# Patient Record
Sex: FEMALE | Race: WHITE | NOT HISPANIC OR LATINO | Employment: UNEMPLOYED | URBAN - METROPOLITAN AREA
[De-identification: names, ages, dates, MRNs, and addresses within clinical notes are randomized per-mention and may not be internally consistent; named-entity substitution may affect disease eponyms.]

---

## 2017-10-10 ENCOUNTER — HOSPITAL ENCOUNTER (OUTPATIENT)
Dept: RADIOLOGY | Facility: MEDICAL CENTER | Age: 62
End: 2017-10-10
Attending: FAMILY MEDICINE
Payer: COMMERCIAL

## 2017-10-10 DIAGNOSIS — Z12.39 BREAST SCREENING: ICD-10-CM

## 2017-10-10 PROCEDURE — G0202 SCR MAMMO BI INCL CAD: HCPCS

## 2018-01-20 ENCOUNTER — OFFICE VISIT (OUTPATIENT)
Dept: URGENT CARE | Facility: CLINIC | Age: 63
End: 2018-01-20
Payer: COMMERCIAL

## 2018-01-20 ENCOUNTER — APPOINTMENT (OUTPATIENT)
Dept: RADIOLOGY | Facility: IMAGING CENTER | Age: 63
End: 2018-01-20
Attending: NURSE PRACTITIONER
Payer: COMMERCIAL

## 2018-01-20 VITALS
TEMPERATURE: 97.8 F | BODY MASS INDEX: 20.89 KG/M2 | DIASTOLIC BLOOD PRESSURE: 70 MMHG | WEIGHT: 130 LBS | HEART RATE: 80 BPM | RESPIRATION RATE: 16 BRPM | SYSTOLIC BLOOD PRESSURE: 102 MMHG | HEIGHT: 66 IN | OXYGEN SATURATION: 96 %
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
## 2018-01-20 DIAGNOSIS — M54.2 NECK PAIN, ACUTE: ICD-10-CM

## 2018-01-20 DIAGNOSIS — S20.212A CHEST WALL CONTUSION, LEFT, INITIAL ENCOUNTER: ICD-10-CM

## 2018-01-20 PROCEDURE — 99213 OFFICE O/P EST LOW 20 MIN: CPT | Performed by: NURSE PRACTITIONER

## 2018-01-20 PROCEDURE — 72040 X-RAY EXAM NECK SPINE 2-3 VW: CPT | Mod: TC | Performed by: NURSE PRACTITIONER

## 2018-01-20 ASSESSMENT — ENCOUNTER SYMPTOMS
NECK PAIN: 0
BACK PAIN: 0
MYALGIAS: 0
WEAKNESS: 0
SHORTNESS OF BREATH: 0
HEADACHES: 0
DIZZINESS: 0
ABDOMINAL PAIN: 0
TINGLING: 0
COUGH: 0
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
## 2018-01-24 ENCOUNTER — OFFICE VISIT (OUTPATIENT)
Dept: MEDICAL GROUP | Facility: PHYSICIAN GROUP | Age: 63
End: 2018-01-24
Payer: COMMERCIAL

## 2018-01-24 VITALS
SYSTOLIC BLOOD PRESSURE: 112 MMHG | HEART RATE: 68 BPM | DIASTOLIC BLOOD PRESSURE: 86 MMHG | WEIGHT: 131 LBS | OXYGEN SATURATION: 98 % | TEMPERATURE: 98.1 F | BODY MASS INDEX: 21.05 KG/M2 | RESPIRATION RATE: 16 BRPM | HEIGHT: 66 IN

## 2018-01-24 DIAGNOSIS — F33.1 MODERATE EPISODE OF RECURRENT MAJOR DEPRESSIVE DISORDER (HCC): ICD-10-CM

## 2018-01-24 DIAGNOSIS — Z13.1 SCREENING FOR DIABETES MELLITUS (DM): ICD-10-CM

## 2018-01-24 DIAGNOSIS — Z13.6 SCREENING FOR CARDIOVASCULAR CONDITION: ICD-10-CM

## 2018-01-24 DIAGNOSIS — Z13.29 SCREENING FOR THYROID DISORDER: ICD-10-CM

## 2018-01-24 DIAGNOSIS — J30.89 ENVIRONMENTAL AND SEASONAL ALLERGIES: ICD-10-CM

## 2018-01-24 PROBLEM — Z76.89 ENCOUNTER TO ESTABLISH CARE: Status: ACTIVE | Noted: 2018-01-24

## 2018-01-24 PROBLEM — Z00.00 WELLNESS EXAMINATION: Status: ACTIVE | Noted: 2018-01-24

## 2018-01-24 PROCEDURE — 99213 OFFICE O/P EST LOW 20 MIN: CPT | Performed by: PHYSICIAN ASSISTANT

## 2018-01-24 RX ORDER — FLUTICASONE PROPIONATE 50 MCG
SPRAY, SUSPENSION (ML) NASAL
Qty: 1 BOTTLE | Refills: 0 | Status: SHIPPED | OUTPATIENT
Start: 2018-01-24 | End: 2018-04-17 | Stop reason: SDUPTHER

## 2018-01-24 RX ORDER — LORAZEPAM 1 MG/1
1 TABLET ORAL EVERY 4 HOURS PRN
COMMUNITY
End: 2018-01-24

## 2018-01-24 ASSESSMENT — PATIENT HEALTH QUESTIONNAIRE - PHQ9: CLINICAL INTERPRETATION OF PHQ2 SCORE: 0

## 2018-01-24 ASSESSMENT — PAIN SCALES - GENERAL: PAINLEVEL: NO PAIN

## 2018-01-24 NOTE — ASSESSMENT & PLAN NOTE
Chronic but stable condition. Patient states she takes cetirizine 10 mg once daily and uses Flonase daily to help alleviate symptoms. States symptoms are managed by medication. Asymptomatic at this time.

## 2018-01-24 NOTE — ASSESSMENT & PLAN NOTE
Chronic. States she follows up with her psychiatrist once monthly and her therapist once monthly. States she is prescribed mirtazapine 50 mg tablet once daily. States she is compliant with medication and experiences no side effects or complications from medication. States medication is managed by psychiatrist. Denies homicidal or suicidal ideation. States she has been experiencing appetite changes and mood fluctuations that are abnormal for her. Patient is concerned about thyroid and is requesting thyroid lab work. Denies hair loss, hoarseness of voice, dry skin, constipation, chest pain, shortness breath, palpitations, temperature tolerance.
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
## 2018-02-09 ENCOUNTER — HOSPITAL ENCOUNTER (OUTPATIENT)
Dept: LAB | Facility: MEDICAL CENTER | Age: 63
End: 2018-02-09
Attending: PHYSICIAN ASSISTANT
Payer: COMMERCIAL

## 2018-02-09 DIAGNOSIS — Z13.1 SCREENING FOR DIABETES MELLITUS (DM): ICD-10-CM

## 2018-02-09 DIAGNOSIS — Z13.6 SCREENING FOR CARDIOVASCULAR CONDITION: ICD-10-CM

## 2018-02-09 DIAGNOSIS — Z13.29 SCREENING FOR THYROID DISORDER: ICD-10-CM

## 2018-02-09 LAB
ALBUMIN SERPL BCP-MCNC: 3.9 G/DL (ref 3.2–4.9)
ALBUMIN/GLOB SERPL: 1.3 G/DL
ALP SERPL-CCNC: 50 U/L (ref 30–99)
ALT SERPL-CCNC: 18 U/L (ref 2–50)
ANION GAP SERPL CALC-SCNC: 4 MMOL/L (ref 0–11.9)
AST SERPL-CCNC: 16 U/L (ref 12–45)
BILIRUB SERPL-MCNC: 0.3 MG/DL (ref 0.1–1.5)
BUN SERPL-MCNC: 18 MG/DL (ref 8–22)
CALCIUM SERPL-MCNC: 9.6 MG/DL (ref 8.5–10.5)
CHLORIDE SERPL-SCNC: 108 MMOL/L (ref 96–112)
CHOLEST SERPL-MCNC: 187 MG/DL (ref 100–199)
CO2 SERPL-SCNC: 30 MMOL/L (ref 20–33)
CREAT SERPL-MCNC: 0.79 MG/DL (ref 0.5–1.4)
GLOBULIN SER CALC-MCNC: 2.9 G/DL (ref 1.9–3.5)
GLUCOSE SERPL-MCNC: 91 MG/DL (ref 65–99)
HDLC SERPL-MCNC: 42 MG/DL
LDLC SERPL CALC-MCNC: 125 MG/DL
POTASSIUM SERPL-SCNC: 5 MMOL/L (ref 3.6–5.5)
PROT SERPL-MCNC: 6.8 G/DL (ref 6–8.2)
SODIUM SERPL-SCNC: 142 MMOL/L (ref 135–145)
TRIGL SERPL-MCNC: 99 MG/DL (ref 0–149)
TSH SERPL DL<=0.005 MIU/L-ACNC: 3.36 UIU/ML (ref 0.38–5.33)

## 2018-02-09 PROCEDURE — 84443 ASSAY THYROID STIM HORMONE: CPT

## 2018-02-09 PROCEDURE — 80053 COMPREHEN METABOLIC PANEL: CPT

## 2018-02-09 PROCEDURE — 36415 COLL VENOUS BLD VENIPUNCTURE: CPT

## 2018-02-09 PROCEDURE — 80061 LIPID PANEL: CPT

## 2018-02-14 ENCOUNTER — TELEPHONE (OUTPATIENT)
Dept: MEDICAL GROUP | Facility: PHYSICIAN GROUP | Age: 63
End: 2018-02-14

## 2018-02-14 NOTE — TELEPHONE ENCOUNTER
----- Message from Radha Castle P.A.-C. sent at 2/14/2018  2:12 PM PST -----  Please call patient. I have reviewed labs and LDL (bad cholesterol) is mildly elevated. Electrolytes, fasting glucose, liver and kidney function is within normal limits. Thyroid lab work was within normal limits.   Advise patient to continue working on diet and exercise.     Thank you,    Charleen ACHARYA

## 2018-02-14 NOTE — LETTER
February 16, 2018        Melissa Holley  [address] [place] [address]        Dear Melissa    We were unable to reach you by phone, please message or call if you have questions.  Here are the results of your test(s):   ----- Message from Radha Castle P.A.-C. sent at 2/14/2018  2:12 PM PST -----  Please call patient. I have reviewed labs and LDL (bad cholesterol) is mildly elevated. Electrolytes, fasting glucose, liver and kidney function is within normal limits. Thyroid lab work was within normal limits.   Advise patient to continue working on diet and exercise.      Thank you,     Charleen ACHARYA       Sincerely,        Irena Irwin  Signed Electronically

## 2018-02-14 NOTE — TELEPHONE ENCOUNTER
Phone Number Called: [phone] (home)     Message: Unable to reach pt left vm to call back.     Left Message for patient to call back: yes

## 2018-02-16 NOTE — TELEPHONE ENCOUNTER
Phone Number Called: [phone] (home)     Message: Unable to reach pt left vm to call back. Sent letter to notify pt.     Left Message for patient to call back: yes

## 2018-02-21 ENCOUNTER — OFFICE VISIT (OUTPATIENT)
Dept: MEDICAL GROUP | Facility: PHYSICIAN GROUP | Age: 63
End: 2018-02-21
Payer: COMMERCIAL

## 2018-02-21 VITALS
SYSTOLIC BLOOD PRESSURE: 100 MMHG | WEIGHT: 130.8 LBS | OXYGEN SATURATION: 95 % | HEART RATE: 70 BPM | DIASTOLIC BLOOD PRESSURE: 74 MMHG | RESPIRATION RATE: 16 BRPM | HEIGHT: 66 IN | TEMPERATURE: 98.1 F | BODY MASS INDEX: 21.02 KG/M2

## 2018-02-21 DIAGNOSIS — F33.1 MODERATE EPISODE OF RECURRENT MAJOR DEPRESSIVE DISORDER (HCC): ICD-10-CM

## 2018-02-21 DIAGNOSIS — E78.00 ELEVATED LDL CHOLESTEROL LEVEL: ICD-10-CM

## 2018-02-21 PROCEDURE — 99213 OFFICE O/P EST LOW 20 MIN: CPT | Performed by: PHYSICIAN ASSISTANT

## 2018-02-21 RX ORDER — MIRTAZAPINE 15 MG/1
15 TABLET, FILM COATED ORAL DAILY
Refills: 1 | COMMUNITY
Start: 2018-02-17 | End: 2020-06-24 | Stop reason: SDUPTHER

## 2018-02-21 ASSESSMENT — PAIN SCALES - GENERAL: PAINLEVEL: NO PAIN

## 2018-02-21 NOTE — ASSESSMENT & PLAN NOTE
Discussed lipid profile lab work from 2/19/18 with patient. Results are as follows:  Cholesterol,Tot 187  100 - 199 mg/dL Final   Triglycerides 99  0 - 149 mg/dL Final   HDL 42  >=40 mg/dL Final      <100 mg/dL Final     Patient states she eats a gluten-free diet but denies having a regular exercise routine. States she takes over-the-counter 1000 mg of fish oil daily. Denies fried fatty foods. She  reports that she has been smoking.  She has a 6.25 pack-year smoking history. She has never used smokeless tobacco. Denies chest pain, shortness of breath, chest palpitations, dizziness, syncope, vision changes, severe headache, diaphoresis, nausea, vomiting, abdominal pain, jaw claudication.

## 2018-02-21 NOTE — ASSESSMENT & PLAN NOTE
Chronic but stable condition. Currently taken mirtazapine 50 mg Once daily. States she is compliant with medication experienced no side effects or complications from medication. States she follows up with her psychiatrist once monthly and her therapist once monthly. Medication is managed by psychiatrist. Denies homicidal or suicidal ideation. States mood is improving and she is still coping with her 's death.

## 2018-02-21 NOTE — PROGRESS NOTES
Chief Complaint   Patient presents with   • Results     labs        HISTORY OF PRESENT ILLNESS: Melissa Holley is an established 62 y.o. female here to discuss the evaluation and management of:    Elevated LDL cholesterol level  Discussed lipid profile lab work from 2/19/18 with patient. Results are as follows:  Cholesterol,Tot 187  100 - 199 mg/dL Final   Triglycerides 99  0 - 149 mg/dL Final   HDL 42  >=40 mg/dL Final      <100 mg/dL Final     Patient states she eats a gluten-free diet but denies having a regular exercise routine. States she takes over-the-counter 1000 mg of fish oil daily. Denies fried fatty foods. She  reports that she has been smoking.  She has a 6.25 pack-year smoking history. She has never used smokeless tobacco. Denies chest pain, shortness of breath, chest palpitations, dizziness, syncope, vision changes, severe headache, diaphoresis, nausea, vomiting, abdominal pain, jaw claudication.      Moderate episode of recurrent major depressive disorder (CMS-HCC)  Chronic but stable condition. Currently taken mirtazapine 50 mg Once daily. States she is compliant with medication experienced no side effects or complications from medication. States she follows up with her psychiatrist once monthly and her therapist once monthly. Medication is managed by psychiatrist. Denies homicidal or suicidal ideation. States mood is improving and she is still coping with her 's death.       Patient Active Problem List    Diagnosis Date Noted   • Elevated LDL cholesterol level 02/21/2018   • Encounter to establish care 01/24/2018   • Moderate episode of recurrent major depressive disorder (CMS-HCC) 01/24/2018   • Environmental and seasonal allergies 01/24/2018   • Family history of ischemic heart disease 09/11/2012   • Current smoker 09/11/2012       Allergies:Dilaudid [hydromorphone hcl] and Sulfa drugs    Current Outpatient Prescriptions   Medication Sig Dispense Refill   • mirtazapine  (REMERON) 15 MG Tab Take 15 mg by mouth every day.  1   • fluticasone (FLONASE) 50 MCG/ACT nasal spray SPRAY TWICE IN EACH NOSTRIL EVERY DAY AS DIRECTED 1 Bottle 0   • cetirizine (ZYRTEC) 10 MG Tab Take 10 mg by mouth every day.       No current facility-administered medications for this visit.        Social History   Substance Use Topics   • Smoking status: Current Every Day Smoker     Packs/day: 0.25     Years: 25.00   • Smokeless tobacco: Never Used   • Alcohol use No       Family Status   Relation Status   • Mother    • Father    • Sister Alive   • Brother Alive   • Maternal Grandmother    • Maternal Grandfather    • Paternal Grandmother    • Paternal Grandfather    • Sister Alive   • Sister Alive   • Sister Alive   • Sister Alive   • Sister    • Sister    • Sister    • Son Alive   • Son Alive   • Daughter Alive     Family History   Problem Relation Age of Onset   • Heart Disease Mother    • Hypertension Mother    • Stroke Mother    • Other Father      Hep C   • Thyroid Sister    • Hypertension Sister    • No Known Problems Brother    • Thyroid Sister    • Hypertension Sister    • Thyroid Sister    • Hypertension Sister    • Thyroid Sister    • Thyroid Sister      Hashimoto   • No Known Problems Sister    • No Known Problems Sister    • No Known Problems Sister    • Thyroid Son      No puititary gland   • No Known Problems Son    • No Known Problems Daughter        ROS:  Review of Systems   Constitutional: Negative for fever, chills, weight loss and malaise/fatigue.   HENT: Negative for ear pain, nosebleeds, congestion, sore throat and neck pain.    Eyes: Negative for blurred vision.   Respiratory: Negative for cough, sputum production, shortness of breath and wheezing.    Cardiovascular: Negative for chest pain, palpitations, orthopnea and leg swelling.   Gastrointestinal: Negative for heartburn, nausea, vomiting and abdominal pain.   Genitourinary: Negative for  "dysuria, urgency and frequency.   Musculoskeletal: Negative for myalgias, back pain and joint pain.   Skin: Negative for rash and itching.   Neurological: Negative for dizziness, tingling, tremors, sensory change, focal weakness and headaches.   Endo/Heme/Allergies: Does not bruise/bleed easily.   Psychiatric/Behavioral: Negative suicidal ideas and memory loss.  The patient is not nervous/anxious and does not have insomnia.  Positive for depression.  All other systems reviewed and are negative except as in HPI.    Exam: Blood pressure 100/74, pulse 70, temperature 36.7 °C (98.1 °F), resp. rate 16, height 1.664 m (5' 5.5\"), weight 59.3 kg (130 lb 12.8 oz), last menstrual period 09/11/2008, SpO2 95 %, not currently breastfeeding. Body mass index is 21.44 kg/m².  General: Normal appearing. No distress.  HEENT: Normocephalic. Eyes conjunctiva clear lids without ptosis, pupils equal and reactive to light accommodation, ears normal shape and contour, canals are clear bilaterally, tympanic membranes are benign, nasal mucosa benign, oropharynx is without erythema, edema or exudates.   Neck: Supple without JVD or bruit. Thyroid is not enlarged.  Pulmonary: Clear to ausculation.  Normal effort. No rales, ronchi, or wheezing.  Cardiovascular: Regular rate and rhythm without murmur. Carotid and radial pulses are intact and equal bilaterally.  Abdomen: Soft, nontender, nondistended. Normal bowel sounds. Liver and spleen are not palpable. No CVA tenderness with palpation.  Neurologic: Grossly nonfocal.  Cranial nerves are normal. Lower extremity DTR's normal and symmetric.  Lymph: No cervical, supraclavicular or axillary lymph nodes are palpable  Skin: Warm and dry.  No rashes or suspicious skin lesions.  Musculoskeletal: Normal gait. No extremity cyanosis, clubbing, or edema.  Psych: Normal mood and affect. Alert and oriented x3. Judgment and insight is normal.    Medical decision-making and discussion:  1. Elevated LDL " cholesterol level  Discussed lipid profile lab work from 2/19/18 with patient. Results are as follows:  Cholesterol,Tot 187  100 - 199 mg/dL Final   Triglycerides 99  0 - 149 mg/dL Final   HDL 42  >=40 mg/dL Final      <100 mg/dL Final     Suggested moderate intensisty statin therapy. Patient declined statin therapy at this time. States she will continue to work on diet and exercise. Discussed smoking cessation with patient. Patient is not ready to quit smoking at this time.  - Encouraged diet high in fruits, vegetables, and fiber. And a diet low in salt, refined carbohydrates, cholesterol, saturated fat, and trans fatty acids.    - Encouraged  a minimum of 30 minutes of moderate intensity aerobic exercise (eg, brisk walking) is recommended on five days each week. Or 20 minutes of vigorous-intensity aerobic exercise (eg, jogging) on three days each week.       2. Moderate episode of recurrent major depressive disorder (CMS-HCC)  Patient is feeling well on current medications. Will continue. Denies any suicidal or homicidal ideation. Emphasized importance of healthy diet and exercise. Discussed that should the patient have any symptoms they should call suicide prevention hotline or report to the emergency room immediately.      Electrolytes, fasting glucose, liver and kidney function are within normal limits. Lab work does not indicate thyroid disease.    Please note that this dictation was created using voice recognition software. I have made every reasonable attempt to correct obvious errors, but I expect that there are errors of grammar and possibly content that I did not discover before finalizing the note.      Return in about 1 year (around 2/21/2019).

## 2018-04-17 DIAGNOSIS — J30.89 ENVIRONMENTAL AND SEASONAL ALLERGIES: ICD-10-CM

## 2018-04-17 RX ORDER — FLUTICASONE PROPIONATE 50 MCG
SPRAY, SUSPENSION (ML) NASAL
Qty: 1 BOTTLE | Refills: 2 | Status: SHIPPED | OUTPATIENT
Start: 2018-04-17 | End: 2018-09-07 | Stop reason: SDUPTHER

## 2018-09-07 DIAGNOSIS — J30.89 ENVIRONMENTAL AND SEASONAL ALLERGIES: ICD-10-CM

## 2018-09-07 RX ORDER — FLUTICASONE PROPIONATE 50 MCG
SPRAY, SUSPENSION (ML) NASAL
Qty: 1 BOTTLE | Refills: 6 | Status: SHIPPED | OUTPATIENT
Start: 2018-09-07

## 2019-11-30 ENCOUNTER — OFFICE VISIT (OUTPATIENT)
Dept: URGENT CARE | Facility: PHYSICIAN GROUP | Age: 64
End: 2019-11-30
Payer: COMMERCIAL

## 2019-11-30 VITALS
DIASTOLIC BLOOD PRESSURE: 80 MMHG | HEART RATE: 80 BPM | TEMPERATURE: 97.8 F | OXYGEN SATURATION: 98 % | RESPIRATION RATE: 16 BRPM | SYSTOLIC BLOOD PRESSURE: 132 MMHG

## 2019-11-30 DIAGNOSIS — L08.9 INFECTED SEBACEOUS CYST: ICD-10-CM

## 2019-11-30 DIAGNOSIS — L72.3 INFECTED SEBACEOUS CYST: ICD-10-CM

## 2019-11-30 PROCEDURE — 99214 OFFICE O/P EST MOD 30 MIN: CPT | Performed by: FAMILY MEDICINE

## 2019-11-30 RX ORDER — DOXYCYCLINE HYCLATE 100 MG
100 TABLET ORAL 2 TIMES DAILY
Qty: 20 TAB | Refills: 0 | Status: SHIPPED | OUTPATIENT
Start: 2019-11-30 | End: 2019-12-10

## 2019-11-30 ASSESSMENT — ENCOUNTER SYMPTOMS
NUMBNESS: 0
CHILLS: 0
FEVER: 0
WEAKNESS: 0

## 2019-11-30 NOTE — PROGRESS NOTES
Subjective:   Melissa Holley  is a 64 y.o. female who presents for Abscess (Possible abscess on back)        Cyst   This is a new problem. The current episode started yesterday. The problem occurs constantly. The problem has been rapidly worsening. Pertinent negatives include no chills, fever, numbness or weakness.     Review of Systems   Constitutional: Negative for chills and fever.   Neurological: Negative for weakness and numbness.     Allergies   Allergen Reactions   • Dilaudid [Hydromorphone Hcl]    • Sulfa Drugs       Objective:   /80   Pulse 80   Temp 36.6 °C (97.8 °F) (Temporal)   Resp 16   LMP 09/11/2008   SpO2 98%   Physical Exam  Constitutional:       General: She is not in acute distress.     Appearance: She is well-developed.   Eyes:      Pupils: Pupils are equal, round, and reactive to light.   Cardiovascular:      Rate and Rhythm: Normal rate and regular rhythm.      Heart sounds: Normal heart sounds.   Pulmonary:      Effort: Pulmonary effort is normal. No respiratory distress.      Breath sounds: Normal breath sounds.   Abdominal:      General: Bowel sounds are normal. There is no distension.      Palpations: Abdomen is soft.   Musculoskeletal: Normal range of motion.   Skin:     General: Skin is warm and dry.          Neurological:      Mental Status: She is alert and oriented to person, place, and time.      Deep Tendon Reflexes: Reflexes are normal and symmetric.   Psychiatric:         Behavior: Behavior normal.           Assessment/Plan:   1. Infected sebaceous cyst  - doxycycline (VIBRAMYCIN) 100 MG Tab; Take 1 Tab by mouth 2 times a day for 10 days.  Dispense: 20 Tab; Refill: 0  - REFERRAL TO GENERAL SURGERY    Differential diagnosis, natural history, supportive care, and indications for immediate follow-up discussed.

## 2020-06-22 ENCOUNTER — TELEPHONE (OUTPATIENT)
Dept: MEDICAL GROUP | Facility: PHYSICIAN GROUP | Age: 65
End: 2020-06-22

## 2020-06-22 NOTE — TELEPHONE ENCOUNTER
Phone Number Called: [phone] (home)     Call outcome: Did not leave a detailed message. Requested patient to call back.    Message: Called patient after receiving an email from the contact center that she needed to be contacted ASAP re: medications. No answer, left message for her to call us back at her convenience.

## 2020-06-24 ENCOUNTER — OFFICE VISIT (OUTPATIENT)
Dept: MEDICAL GROUP | Facility: PHYSICIAN GROUP | Age: 65
End: 2020-06-24
Payer: COMMERCIAL

## 2020-06-24 VITALS
HEART RATE: 68 BPM | DIASTOLIC BLOOD PRESSURE: 80 MMHG | OXYGEN SATURATION: 94 % | RESPIRATION RATE: 16 BRPM | SYSTOLIC BLOOD PRESSURE: 126 MMHG | TEMPERATURE: 97.8 F | WEIGHT: 138 LBS | BODY MASS INDEX: 22.18 KG/M2 | HEIGHT: 66 IN

## 2020-06-24 DIAGNOSIS — K57.30 DIVERTICULA OF COLON: ICD-10-CM

## 2020-06-24 DIAGNOSIS — Z12.31 ENCOUNTER FOR SCREENING MAMMOGRAM FOR BREAST CANCER: ICD-10-CM

## 2020-06-24 DIAGNOSIS — E78.00 ELEVATED LDL CHOLESTEROL LEVEL: ICD-10-CM

## 2020-06-24 DIAGNOSIS — J30.89 ENVIRONMENTAL AND SEASONAL ALLERGIES: ICD-10-CM

## 2020-06-24 DIAGNOSIS — Z00.00 WELLNESS EXAMINATION: ICD-10-CM

## 2020-06-24 DIAGNOSIS — Z86.010 PERSONAL HISTORY OF COLONIC POLYPS: ICD-10-CM

## 2020-06-24 DIAGNOSIS — Z23 NEED FOR VACCINATION: ICD-10-CM

## 2020-06-24 DIAGNOSIS — F33.1 MODERATE EPISODE OF RECURRENT MAJOR DEPRESSIVE DISORDER (HCC): ICD-10-CM

## 2020-06-24 PROCEDURE — 99396 PREV VISIT EST AGE 40-64: CPT | Performed by: PHYSICIAN ASSISTANT

## 2020-06-24 RX ORDER — MIRTAZAPINE 15 MG/1
15 TABLET, FILM COATED ORAL DAILY
Qty: 90 TAB | Refills: 3 | Status: SHIPPED | OUTPATIENT
Start: 2020-06-24 | End: 2021-11-03

## 2020-06-24 ASSESSMENT — PATIENT HEALTH QUESTIONNAIRE - PHQ9
5. POOR APPETITE OR OVEREATING: 1 - SEVERAL DAYS
SUM OF ALL RESPONSES TO PHQ QUESTIONS 1-9: 7
CLINICAL INTERPRETATION OF PHQ2 SCORE: 2

## 2020-06-24 NOTE — PROGRESS NOTES
Chief Complaint   Patient presents with   • Annual Exam     med refill        HISTORY OF PRESENT ILLNESS: Melissa Holley is an established 64 y.o. female here to discuss the evaluation and management of:    Patient is a pleasant 64-year-old female here today for annual wellness examination.  She denies doing self breast exams.  States she does take an over-the-counter multivitamin once daily as well as fish oil daily.  States she recently ran out of magnesium and calcium combination supplement.  She admits that she could improve on sunscreen use.  States she practices seatbelt safety.  States on average she has 1 soft bowel movement per day.  Positive for good urine output.  She tells me that her diet is healthy and occasionally she eats sweets.  She tells me that she does get regular eye exams and wears corrective lenses.  States she is nearsighted.  She tells me that she had a dental exam last year but needs to follow back up.  She tells me that she has not been exercising regularly.  She has been concerned about exercising during COVID-19 pandemic.  She tells me that there is also a lot of homeless around her home and they have been hostile lately.      Moderate episode of recurrent major depressive disorder (HCC)  Chronic problem.  Could improve.  Patient states she recently ran out of her antidepressant medication Remeron 15 mg tab once daily.  States she has been out of medication for 7 days.  States medication also helps treat occasional sleep deprivation symptoms.  Patient states she feels when she takes medicine consistently depression symptoms are managed on medication.  She is requesting a refill.  States she had been following up with a therapist and psychiatrist up until this year.  She tells me that her therapist and psychiatrist both left the practice.  Patient has agreed to reestablish care with a new psychiatrist and therapist.      Elevated LDL cholesterol level  Chronic problem.  Appears  to be stable.  Lab work has not been completed since 2018.  She tells me that her diet is healthy but she needs to improve on exercising regularly.      Environmental and seasonal allergies  Chronic but stable problem.  Use over-the-counter Flonase and antihistamines with improvement of symptoms.  No further work-up indicated.      Diverticula of colon  Personal history of colonic polyps  Reviewed colonoscopy results from 2014.  Patient was positive for diverticuli and a polyp that was removed.  Polyp was benign.  Patient has been advised to follow back up with gastroenterology consultants for repeat colonoscopy.  She agrees to plan.  She tells me that she is asymptomatic and doing well.        Patient Active Problem List    Diagnosis Date Noted   • Diverticula of colon 06/24/2020   • Personal history of colonic polyps 06/24/2020   • Elevated LDL cholesterol level 02/21/2018   • Encounter to establish care 01/24/2018   • Moderate episode of recurrent major depressive disorder (HCC) 01/24/2018   • Environmental and seasonal allergies 01/24/2018   • Family history of ischemic heart disease 09/11/2012   • Current smoker 09/11/2012       Allergies:Dairy food allergy; Dilaudid hydromorphone hcl; Gluten meal; and Sulfa drugs    Current Outpatient Medications   Medication Sig Dispense Refill   • mirtazapine (REMERON) 15 MG Tab Take 1 Tab by mouth every day. 90 Tab 3   • Zoster Vac Recomb Adjuvanted (SHINGRIX) 50 MCG/0.5ML Recon Susp 0.5 mL by Intramuscular route Once for 1 dose. 0.5 mL 1   • fluticasone (FLONASE) 50 MCG/ACT nasal spray SPRAY TWICE IN EACH NOSTRIL EVERY DAY AS DIRECTED 1 Bottle 6   • cetirizine (ZYRTEC) 10 MG Tab Take 10 mg by mouth every day.       No current facility-administered medications for this visit.        Social History     Tobacco Use   • Smoking status: Current Every Day Smoker     Packs/day: 0.20     Years: 27.00     Pack years: 5.40     Types: Cigarettes   • Smokeless tobacco: Never Used    Substance Use Topics   • Alcohol use: No   • Drug use: No       Family Status   Relation Name Status   • Mo     • Fa     • Sis  Alive   • Bro  Alive   • MGMo     • MGFa     • PGMo     • PGFa     • Sis  Alive   • Sis  Alive   • Sis  Alive   • Sis  Alive   • Sis  (Not Specified)   • Sis  (Not Specified)   • Sis  (Not Specified)   • Son  Alive   • Son  Alive   • Eric  Alive     Family History   Problem Relation Age of Onset   • Heart Disease Mother    • Hypertension Mother    • Stroke Mother    • Other Father         Hep C   • Thyroid Sister    • Hypertension Sister    • No Known Problems Brother    • Thyroid Sister    • Hypertension Sister    • Thyroid Sister    • Hypertension Sister    • Thyroid Sister    • Thyroid Sister         Hashimoto   • No Known Problems Sister    • No Known Problems Sister    • No Known Problems Sister    • Thyroid Son         No puititary gland   • No Known Problems Son    • No Known Problems Daughter        ROS:  Review of Systems   Constitutional: Negative for fever, chills, weight loss and malaise/fatigue.   HENT: Negative for ear pain, nosebleeds, congestion, sore throat and neck pain.    Eyes: Negative for blurred vision.   Respiratory: Negative for cough, sputum production, shortness of breath and wheezing.    Cardiovascular: Negative for chest pain, palpitations, orthopnea and leg swelling.   Gastrointestinal: Negative for heartburn, nausea, vomiting and abdominal pain.   Genitourinary: Negative for dysuria, urgency and frequency.   Musculoskeletal: Negative for myalgias, back pain and joint pain.   Skin: Negative for rash and itching.   Neurological: Negative for dizziness, tingling, tremors, sensory change, focal weakness and headaches.   Endo/Heme/Allergies: Does not bruise/bleed easily.   Psychiatric/Behavioral: Negative for suicidal ideas and memory loss.  The patient is not nervous/anxious and does not have insomnia.  + for  "depression.   All other systems reviewed and are negative except as in HPI.    Exam: /80 (BP Location: Left arm, Patient Position: Sitting)   Pulse 68   Temp 36.6 °C (97.8 °F) (Temporal)   Resp 16   Ht 1.664 m (5' 5.5\")   Wt 62.6 kg (138 lb)   SpO2 94%  Body mass index is 22.62 kg/m².  General: Normal appearing. No distress.  HEENT: Normocephalic. Eyes conjunctiva clear lids without ptosis, pupils equal and reactive to light accommodation, ears normal shape and contour, canals are clear bilaterally, tympanic membranes are benign, nasal mucosa benign, oropharynx is without erythema, edema or exudates.   Neck: Supple without JVD. Thyroid is not enlarged.  Pulmonary: Clear to ausculation.  Normal effort. No rales, ronchi, or wheezing.  Cardiovascular: Regular rate and rhythm without murmur.   Abdomen: Soft, nontender, nondistended. Normal bowel sounds. Liver and spleen are not palpable  Neurologic: Grossly nonfocal.  Cranial nerves are normal.   Skin: Warm and dry.  No rashes or suspicious skin lesions.  Musculoskeletal: Normal gait. No extremity cyanosis, clubbing, or edema.  Psych: Normal mood and affect. Alert and oriented x3. Judgment and insight is normal.    Medical decision-making and discussion:  1. Wellness examination  - Encouraged diet high in fruits, vegetables, and fiber. And a diet low in salt, refined carbohydrates, cholesterol, saturated fat, and trans fatty acids.    - Encouraged  a minimum of 30 minutes of moderate intensity aerobic exercise (eg, brisk walking) is recommended on five days each week. Or 20 minutes of vigorous-intensity aerobic exercise (eg, jogging) on three days each week.     Advised patient to make sure that she is getting adequate vitamin D and calcium.  Advised patient if she is not getting 1200 mg of calcium in her diet to supplement with 500-1000 mg of calcium once daily.  Advised patient she should be getting at least 1-2000 units of vitamin D once daily.  Continue " getting regular eye and dental exams.  Advised patient to wear sunscreen and continue practicing seatbelt safety.  Continue work on hydration.  Advised patient she should be doing self breast exams at least once monthly.    Patient will be scheduled for Pap smear in the near future.    Patient to follow-up with gastroenterology consultants for repeat colonoscopy.  Patient agreed to plan.    - Comp Metabolic Panel; Future  - CBC WITH DIFFERENTIAL; Future  - Lipid Profile; Future  - TSH WITH REFLEX TO FT4; Future  - VITAMIN D,25 HYDROXY; Future    2. Moderate episode of recurrent major depressive disorder (HCC)  Chronic problem.  Could improve.  Refilled Remeron for patient.  Continue work on diet, exercise, sleep hygiene, developing healthy coping mechanisms for stress anxiety.  Patient has created to establish care with a new psychiatrist and psychologist.  Referral has been placed.    Denies any suicidal or homicidal ideation. Discussed that should the patient have any symptoms they should call suicide prevention hotline or report to the emergency room immediately.    - TSH WITH REFLEX TO FT4; Future  - mirtazapine (REMERON) 15 MG Tab; Take 1 Tab by mouth every day.  Dispense: 90 Tab; Refill: 3  - REFERRAL TO BEHAVIORAL HEALTH    3. Elevated LDL cholesterol level  Chronic problem.  Appears to be stable.  Lab work has been ordered.  Patient be contacted with results.  Continue working on diet and exercise.    - Lipid Profile; Future    4. Environmental and seasonal allergies  Advised nasal saline and steroid sprays daily. OTC anti-histamines (Zyrtec) as needed. Encouraged allergen avoidence and environment modification when possible. If regular use not effective, may consider referral to allergist for immunotherapy.    - CBC WITH DIFFERENTIAL; Future    5. Diverticula of colon  6. Personal history of colonic polyps  Colonoscopy results from 2014.  Positive for reticulitis and a benign polyp.  Polyp was removed.   Patient has been advised to follow back up with gastroenterology consultants for repeat colonoscopy.  Asymptomatic.    7. Encounter for screening mammogram for breast cancer    - MA-SCREENING MAMMO BILAT W/CAD; Future    8. Need for vaccination  Shingrix prescription was provided to patient.  Advised patient if she is concerned about the cost of vaccinations to contact her medical insurance to discuss the cost.  If she decides to proceed to take prescription to her pharmacist.  Advised patient that she may be placed on a waiting list.  - Zoster Vac Recomb Adjuvanted (SHINGRIX) 50 MCG/0.5ML Recon Susp; 0.5 mL by Intramuscular route Once for 1 dose.  Dispense: 0.5 mL; Refill: 1      Please note that this dictation was created using voice recognition software. I have made every reasonable attempt to correct obvious errors, but I expect that there are errors of grammar and possibly content that I did not discover before finalizing the note.    Assessment/Plan:  1. Wellness examination  Comp Metabolic Panel    CBC WITH DIFFERENTIAL    Lipid Profile    TSH WITH REFLEX TO FT4    VITAMIN D,25 HYDROXY   2. Moderate episode of recurrent major depressive disorder (HCC)  TSH WITH REFLEX TO FT4    mirtazapine (REMERON) 15 MG Tab    REFERRAL TO BEHAVIORAL HEALTH   3. Elevated LDL cholesterol level  Lipid Profile   4. Environmental and seasonal allergies  CBC WITH DIFFERENTIAL   5. Diverticula of colon     6. Personal history of colonic polyps     7. Encounter for screening mammogram for breast cancer  MA-SCREENING MAMMO BILAT W/CAD   8. Need for vaccination  Zoster Vac Recomb Adjuvanted (SHINGRIX) 50 MCG/0.5ML Recon Susp       Return in about 1 year (around 6/24/2021), or if symptoms worsen or fail to improve.

## 2020-06-30 ENCOUNTER — HOSPITAL ENCOUNTER (OUTPATIENT)
Dept: LAB | Facility: MEDICAL CENTER | Age: 65
End: 2020-06-30
Attending: PHYSICIAN ASSISTANT
Payer: COMMERCIAL

## 2020-06-30 DIAGNOSIS — Z00.00 WELLNESS EXAMINATION: ICD-10-CM

## 2020-06-30 DIAGNOSIS — F33.1 MODERATE EPISODE OF RECURRENT MAJOR DEPRESSIVE DISORDER (HCC): ICD-10-CM

## 2020-06-30 DIAGNOSIS — E78.00 ELEVATED LDL CHOLESTEROL LEVEL: ICD-10-CM

## 2020-06-30 DIAGNOSIS — J30.89 ENVIRONMENTAL AND SEASONAL ALLERGIES: ICD-10-CM

## 2020-06-30 LAB
25(OH)D3 SERPL-MCNC: 37 NG/ML (ref 30–100)
ALBUMIN SERPL BCP-MCNC: 4.3 G/DL (ref 3.2–4.9)
ALBUMIN/GLOB SERPL: 1.7 G/DL
ALP SERPL-CCNC: 62 U/L (ref 30–99)
ALT SERPL-CCNC: 19 U/L (ref 2–50)
ANION GAP SERPL CALC-SCNC: 11 MMOL/L (ref 7–16)
AST SERPL-CCNC: 17 U/L (ref 12–45)
BASOPHILS # BLD AUTO: 0.5 % (ref 0–1.8)
BASOPHILS # BLD: 0.03 K/UL (ref 0–0.12)
BILIRUB SERPL-MCNC: 0.4 MG/DL (ref 0.1–1.5)
BUN SERPL-MCNC: 12 MG/DL (ref 8–22)
CALCIUM SERPL-MCNC: 9.4 MG/DL (ref 8.5–10.5)
CHLORIDE SERPL-SCNC: 99 MMOL/L (ref 96–112)
CHOLEST SERPL-MCNC: 178 MG/DL (ref 100–199)
CO2 SERPL-SCNC: 25 MMOL/L (ref 20–33)
CREAT SERPL-MCNC: 0.63 MG/DL (ref 0.5–1.4)
EOSINOPHIL # BLD AUTO: 0.11 K/UL (ref 0–0.51)
EOSINOPHIL NFR BLD: 1.8 % (ref 0–6.9)
ERYTHROCYTE [DISTWIDTH] IN BLOOD BY AUTOMATED COUNT: 45.8 FL (ref 35.9–50)
FASTING STATUS PATIENT QL REPORTED: NORMAL
GLOBULIN SER CALC-MCNC: 2.6 G/DL (ref 1.9–3.5)
GLUCOSE SERPL-MCNC: 88 MG/DL (ref 65–99)
HCT VFR BLD AUTO: 44.6 % (ref 37–47)
HDLC SERPL-MCNC: 40 MG/DL
HGB BLD-MCNC: 14.7 G/DL (ref 12–16)
IMM GRANULOCYTES # BLD AUTO: 0.02 K/UL (ref 0–0.11)
IMM GRANULOCYTES NFR BLD AUTO: 0.3 % (ref 0–0.9)
LDLC SERPL CALC-MCNC: 118 MG/DL
LYMPHOCYTES # BLD AUTO: 2.5 K/UL (ref 1–4.8)
LYMPHOCYTES NFR BLD: 40.1 % (ref 22–41)
MCH RBC QN AUTO: 32.8 PG (ref 27–33)
MCHC RBC AUTO-ENTMCNC: 33 G/DL (ref 33.6–35)
MCV RBC AUTO: 99.6 FL (ref 81.4–97.8)
MONOCYTES # BLD AUTO: 0.57 K/UL (ref 0–0.85)
MONOCYTES NFR BLD AUTO: 9.1 % (ref 0–13.4)
NEUTROPHILS # BLD AUTO: 3.01 K/UL (ref 2–7.15)
NEUTROPHILS NFR BLD: 48.2 % (ref 44–72)
NRBC # BLD AUTO: 0 K/UL
NRBC BLD-RTO: 0 /100 WBC
PLATELET # BLD AUTO: 229 K/UL (ref 164–446)
PMV BLD AUTO: 8.7 FL (ref 9–12.9)
POTASSIUM SERPL-SCNC: 5.2 MMOL/L (ref 3.6–5.5)
PROT SERPL-MCNC: 6.9 G/DL (ref 6–8.2)
RBC # BLD AUTO: 4.48 M/UL (ref 4.2–5.4)
SODIUM SERPL-SCNC: 135 MMOL/L (ref 135–145)
TRIGL SERPL-MCNC: 101 MG/DL (ref 0–149)
TSH SERPL DL<=0.005 MIU/L-ACNC: 1.36 UIU/ML (ref 0.38–5.33)
WBC # BLD AUTO: 6.2 K/UL (ref 4.8–10.8)

## 2020-06-30 PROCEDURE — 80061 LIPID PANEL: CPT

## 2020-06-30 PROCEDURE — 85025 COMPLETE CBC W/AUTO DIFF WBC: CPT

## 2020-06-30 PROCEDURE — 80053 COMPREHEN METABOLIC PANEL: CPT

## 2020-06-30 PROCEDURE — 36415 COLL VENOUS BLD VENIPUNCTURE: CPT

## 2020-06-30 PROCEDURE — 84443 ASSAY THYROID STIM HORMONE: CPT

## 2020-06-30 PROCEDURE — 82306 VITAMIN D 25 HYDROXY: CPT

## 2020-07-07 ENCOUNTER — TELEPHONE (OUTPATIENT)
Dept: MEDICAL GROUP | Facility: MEDICAL CENTER | Age: 65
End: 2020-07-07

## 2020-07-07 DIAGNOSIS — D64.9 ANEMIA, UNSPECIFIED TYPE: ICD-10-CM

## 2020-07-07 NOTE — TELEPHONE ENCOUNTER
----- Message from Radha Castle P.A.-C. sent at 7/7/2020 11:34 AM PDT -----  Please call patient. I have reviewed labs and they are abnormal.  Lab work indicates possible B12 or folate deficiency.  Additional lab work has been ordered to further evaluate.  You do not need to fast to complete this lab work.  Bad cholesterol slightly elevated but has improved from 2 years ago.  Please continue work on diet and exercise.  Electrolytes, fasting glucose, liver and kidney function is within normal limits.  Lab work does not indicate signs of thyroid disease.   Vitamin D is on the lower end of normal.  I suggest take over-the-counter 1-2000 units of vitamin D once daily.    Thank you,    Charleen ACHARYA

## 2020-07-07 NOTE — TELEPHONE ENCOUNTER
Phone Number Called: [phone] (home)       Call outcome: Did not leave a detailed message. Requested patient to call back.    Message: LVM for Pt to c/b about lab results

## 2020-07-09 NOTE — TELEPHONE ENCOUNTER
Phone Number Called: [phone] (home)       Call outcome: Did not leave a detailed message. Requested patient to call back.    Message: LVM for Pt to c/b about lab results

## 2020-07-14 NOTE — TELEPHONE ENCOUNTER
Phone Number Called: [phone] (home)       Call outcome: Spoke to patient regarding message below.    Message: Pt informed, Pt will have follow up labs completed soon.  No questions at this time.

## 2021-03-03 DIAGNOSIS — Z23 NEED FOR VACCINATION: ICD-10-CM

## 2021-12-02 DIAGNOSIS — F33.1 MODERATE EPISODE OF RECURRENT MAJOR DEPRESSIVE DISORDER (HCC): ICD-10-CM

## 2021-12-02 RX ORDER — MIRTAZAPINE 15 MG/1
15 TABLET, FILM COATED ORAL
Qty: 90 TABLET | Refills: 0 | Status: SHIPPED | OUTPATIENT
Start: 2021-12-02

## 2021-12-03 NOTE — TELEPHONE ENCOUNTER
Phone Number Called: [phone]    Call outcome: Left detailed message for patient. Informed to call back with any additional questions.    Message:

## 2021-12-03 NOTE — TELEPHONE ENCOUNTER
PLEASE CONTACT PATIENT.    90 day supply sent, but needs to establish with new provider for further refills.

## 2022-03-05 DIAGNOSIS — F33.1 MODERATE EPISODE OF RECURRENT MAJOR DEPRESSIVE DISORDER (HCC): ICD-10-CM

## 2022-03-07 RX ORDER — MIRTAZAPINE 15 MG/1
15 TABLET, FILM COATED ORAL
Qty: 90 TABLET | Refills: 0 | OUTPATIENT
Start: 2022-03-07